# Patient Record
Sex: MALE | Race: OTHER | HISPANIC OR LATINO | URBAN - METROPOLITAN AREA
[De-identification: names, ages, dates, MRNs, and addresses within clinical notes are randomized per-mention and may not be internally consistent; named-entity substitution may affect disease eponyms.]

---

## 2020-06-11 ENCOUNTER — EMERGENCY (EMERGENCY)
Facility: HOSPITAL | Age: 30
LOS: 1 days | Discharge: HOME | End: 2020-06-11
Attending: EMERGENCY MEDICINE
Payer: MEDICAID

## 2020-06-11 VITALS
HEIGHT: 65 IN | OXYGEN SATURATION: 97 % | RESPIRATION RATE: 16 BRPM | SYSTOLIC BLOOD PRESSURE: 124 MMHG | HEART RATE: 61 BPM | WEIGHT: 144.84 LBS | DIASTOLIC BLOOD PRESSURE: 88 MMHG | TEMPERATURE: 99 F

## 2020-06-11 PROCEDURE — 12002 RPR S/N/AX/GEN/TRNK2.6-7.5CM: CPT

## 2020-06-11 PROCEDURE — 99282 EMERGENCY DEPT VISIT SF MDM: CPT | Mod: 25

## 2020-06-11 PROCEDURE — T1013: CPT

## 2020-06-11 PROCEDURE — 99283 EMERGENCY DEPT VISIT LOW MDM: CPT | Mod: 25

## 2020-06-11 RX ORDER — METHOCARBAMOL 500 MG/1
2 TABLET, FILM COATED ORAL
Qty: 24 | Refills: 0
Start: 2020-06-11 | End: 2020-06-14

## 2020-06-11 RX ORDER — LIDOCAINE 4 G/100G
1 CREAM TOPICAL
Qty: 5 | Refills: 0
Start: 2020-06-11 | End: 2020-06-15

## 2020-06-11 NOTE — ED PROVIDER NOTE - PHYSICAL EXAMINATION
Gen: Well appearing in NAD  Head: NC/AT  Neck: trachea midline  Resp:  No distress  Ext: no deformities  Neuro:  A&O appears non focal  Skin: linear well approximated laceration to palmar aspect of radial aspect wrist. FROM of fingers/wrist, radial pulse intact, normal cap refill.   Psych:  Normal affect and mood

## 2020-06-11 NOTE — ED PROVIDER NOTE - OBJECTIVE STATEMENT
29M no PMH p/w laceration to R wrist with a blade while cutting plastic. Last tetanus within 6 years. No additional injuries.

## 2020-06-11 NOTE — ED PROVIDER NOTE - NSFOLLOWUPINSTRUCTIONS_ED_ALL_ED_FT
-- Follow up with your primary doctor within 2-3 days.  -- Keep sutures covered & dry for 24 hours, then clean with soap/water, apply bacitracin and cover the wound once daily.  -- Return to ED for suture removal in 7-10 days.   -- Any increased pain, redness, streaking (red lines), swelling, fever, chills please immediately return to ER.     Laceration    A laceration is a cut that goes through all of the layers of the skin and into the tissue that is right under the skin. Some lacerations heal on their own. Others need to be closed with skin adhesive strips, skin glue, stitches (sutures), or staples. Proper laceration care minimizes the risk of infection and helps the laceration to heal better.  If non-absorbable stitches or staples have been placed, they must be taken out within the time frame instructed by your healthcare provider.    SEEK IMMEDIATE MEDICAL CARE IF YOU HAVE ANY OF THE FOLLOWING SYMPTOMS: swelling around the wound, worsening pain, drainage from the wound, red streaking going away from your wound, inability to move finger or toe near the laceration, or discoloration of skin near the laceration.

## 2020-06-11 NOTE — ED PROVIDER NOTE - ATTENDING CONTRIBUTION TO CARE
Kasey: I performed a face to face bedside interview with patient regarding history of present illness, review of symptoms and past medical history. I completed an independent physical exam and ordered tests/medications as needed.  I have discussed patient's plan of care with advanced care provider. The advanced care provider assisted in  executing the discussed plan. I was available for any questions or issues that may have arose during the execution of the plan of care.

## 2022-03-25 NOTE — ED PROVIDER NOTE - TEMPLATE, MLM
----- Message from KEYSHA Holder CNP sent at 3/24/2022  1:11 PM EDT -----  Preliminary report shows mild stenosis in right carotid and no significant stenosis in left carotid. Will let you know if the official reading shows anything different.
Called patient left message on machine to call us back.
General

## 2022-11-16 NOTE — ED ADULT TRIAGE NOTE - BP NONINVASIVE SYSTOLIC (MM HG)
124 Clindamycin Pregnancy And Lactation Text: This medication can be used in pregnancy if certain situations. Clindamycin is also present in breast milk.